# Patient Record
(demographics unavailable — no encounter records)

---

## 2024-11-01 NOTE — HISTORY OF PRESENT ILLNESS
[FreeTextEntry1] : 72F with DM, HTN, HLD presents for evaluation of CP   Went to [facility] ER this week for episode of CP  Reported midsternal CP radiating down her L arm a/w lightheadedness Described as a constant "heaviness" lasting 2hrs Trops x 2 negative, CXR clear Admits to being under tremendous stress as is in the course of relocating  Symptoms have since resolved  Prior LBBB on ECG not present on today's ECG  Never smoker.  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 Denies family hx of heart disease.  ECG: SR, nonspecific T wave abnormalities NST 2023: EF 79%, partially reversible defect - artifact vs ischemia/scar TTE 2022: 1. Left ventricular ejection fraction is visually estimated at 60 to 65%. 2. Normal left ventricular size, wall thickness, wall motion, and systolic function. 3. Normal right ventricular size and systolic function. 4. The left atrium is normal in size. 5. Mild calcification of the aortic valve leaflets. No aortic stenosis. 6. There is mild calcification of the mitral valve annulus.  Atorvastatin 40mg  Lisinopril 40mg Amlodipine 10mg  Metformin 850mg BID

## 2024-11-01 NOTE — DISCUSSION/SUMMARY
[EKG obtained to assist in diagnosis and management of assessed problem(s)] : EKG obtained to assist in diagnosis and management of assessed problem(s) [FreeTextEntry1] : 72F with DM, HTN, HLD s/p ER visit for CP   CP: ECG with nonspecific T wave abnormalities. Prior LBBB not present on today's ECG. Will arrange for TTE and CTA to rule out obstructive CAD.  HTN: Good, continue lisinopril 40mg and amlodipine 10mg. BW with PCP. Last crt 0.95 (from ER)   HLD: Continue atorvastatin 40mg, BW with PCP   DM: Care per PCP  RV 6M